# Patient Record
Sex: FEMALE | Race: WHITE | URBAN - METROPOLITAN AREA
[De-identification: names, ages, dates, MRNs, and addresses within clinical notes are randomized per-mention and may not be internally consistent; named-entity substitution may affect disease eponyms.]

---

## 2018-12-18 ENCOUNTER — EMERGENCY (EMERGENCY)
Facility: HOSPITAL | Age: 28
LOS: 0 days | Discharge: HOME | End: 2018-12-18
Attending: EMERGENCY MEDICINE | Admitting: EMERGENCY MEDICINE

## 2018-12-18 VITALS
DIASTOLIC BLOOD PRESSURE: 60 MMHG | SYSTOLIC BLOOD PRESSURE: 122 MMHG | HEART RATE: 90 BPM | OXYGEN SATURATION: 99 % | TEMPERATURE: 97 F | RESPIRATION RATE: 20 BRPM

## 2018-12-18 VITALS
DIASTOLIC BLOOD PRESSURE: 58 MMHG | RESPIRATION RATE: 20 BRPM | HEART RATE: 102 BPM | OXYGEN SATURATION: 98 % | SYSTOLIC BLOOD PRESSURE: 104 MMHG

## 2018-12-18 DIAGNOSIS — T14.91XA SUICIDE ATTEMPT, INITIAL ENCOUNTER: ICD-10-CM
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
## 2018-12-18 DIAGNOSIS — R45.1 RESTLESSNESS AND AGITATION: ICD-10-CM

## 2018-12-18 RX ORDER — HALOPERIDOL DECANOATE 100 MG/ML
5 INJECTION INTRAMUSCULAR ONCE
Refills: 0 | Status: COMPLETED | OUTPATIENT
Start: 2018-12-18 | End: 2018-12-18

## 2018-12-18 RX ADMIN — HALOPERIDOL DECANOATE 5 MILLIGRAM(S): 100 INJECTION INTRAMUSCULAR at 14:26

## 2018-12-18 RX ADMIN — Medication 2 MILLIGRAM(S): at 14:26

## 2018-12-18 NOTE — ED ADULT TRIAGE NOTE - CHIEF COMPLAINT QUOTE
RUDDY from [facility] 6 pt. was trying to kill herself by strangling with pair of pants / pt. turned blue as per staff

## 2018-12-18 NOTE — ED ADULT NURSE NOTE - CHIEF COMPLAINT QUOTE
RUDDY from [facility] pt. was trying to kill herself by strangling with pair of pants / pt. turned blue as per staff

## 2018-12-18 NOTE — ED ADULT NURSE NOTE - NSIMPLEMENTINTERV_GEN_ALL_ED
Implemented All Fall Risk Interventions:  [place] to call system. Call bell, personal items and telephone within reach. Instruct patient to call for assistance. Room bathroom lighting operational. Non-slip footwear when patient is off stretcher. Physically safe environment: no spills, clutter or unnecessary equipment. Stretcher in lowest position, wheels locked, appropriate side rails in place. Provide visual cue, wrist band, yellow gown, etc. Monitor gait and stability. Monitor for mental status changes and reorient to person, place, and time. Review medications for side effects contributing to fall risk. Reinforce activity limits and safety measures with patient and family.

## 2018-12-18 NOTE — ED PROVIDER NOTE - PHYSICAL EXAMINATION
CONSTITUTIONAL: Well-developed; well-nourished; agitated female  SKIN: warm, dry  HEAD: Normocephalic; atraumatic.  EYES: no conj injection  ENT: No nasal discharge; airway clear.  NECK: no contusion noted; Supple; non tender.  EXT: moving all extremities appropriately, no gross injuries noted  NEURO: Alert, oriented  PSYCH: agitated, labile mood

## 2018-12-18 NOTE — ED ADULT NURSE REASSESSMENT NOTE - NS ED NURSE REASSESS COMMENT FT1
pt assessed , A&Ox3, pt states her given name is Gabbie but likes to be called Ilene.  pt received from front of Main Ed, as per Charge RN pt was sent in from [facility] 6 with attendant from [facility]. In report was told pt was found with pants around neck. As per [place] [id number] attendant " when we found her she was blue and had to rip the blue pants off her neck, she ambulated herself on to ambulance and when she was put in handcuffs by police officers for safety she started acting erratic." Pt currently erratic at this time. 1:1 immediately began. 1:1 remains at bedside. safety and comfort measures maintained. will continue to monitor.

## 2018-12-18 NOTE — ED PROVIDER NOTE - PROGRESS NOTE DETAILS
pt assesed, screaming, hit arms against stretcher. pt to be moved to crit. will medicate and 4 point restraint prior Spoke to psych MD at [facility] -- Dr Pickett. Gave history of pt as well as today's episode. Maladaptable to society -- has been inpatient resident at [facility] for 4 years. Pt has no neck pain or other complaints. Not amenable to speaking to our psychiatrist. Will d/c back to facility.

## 2018-12-18 NOTE — ED PROVIDER NOTE - ATTENDING CONTRIBUTION TO CARE
28 y.o female with hx of bipolar disorder, mood lability, impulsivity, chronic inhabitant at [facility] x 3 years presents to ED with outburst at facility where she tied a piece of clothing around her neck and was found breathing spontaneously, but cyanotic.  Patient was freed from clothing and regained consciousness.  Currently has no complaints.  No neck pain, throat pain, dysphonia.  PE:  agree with above.  A/P:  Suicidal Attempt.  No acute medical issues pertaining to this attempt.  D/C back to her home psychiatric facility.

## 2018-12-18 NOTE — ED PROVIDER NOTE - OBJECTIVE STATEMENT
27 y/o female brought in from [facility] for suicide attempt. Patient tied a piece of clothing around her neck, was found down, cyanotic; EMS cut the clothing off and patient was satting in the 90's, spontaneous respirations. 27 y/o female h/o BPD, mood lability, impulsivity brought in from [place] for suicide attempt. Patient had episode of impulsivity -- tied a piece of clothing around her neck, was found down, cyanotic; MD at facility cut the clothing off and patient was satting in the 90's, spontaneous respirations. Pt had similar episode 3 years ago.

## 2019-04-24 PROBLEM — Z00.00 ENCOUNTER FOR PREVENTIVE HEALTH EXAMINATION: Status: ACTIVE | Noted: 2019-04-24

## 2019-05-21 ENCOUNTER — APPOINTMENT (OUTPATIENT)
Dept: DERMATOLOGY | Facility: CLINIC | Age: 29
End: 2019-05-21

## 2019-05-21 ENCOUNTER — OUTPATIENT (OUTPATIENT)
Dept: OUTPATIENT SERVICES | Facility: HOSPITAL | Age: 29
LOS: 1 days | Discharge: HOME | End: 2019-05-21

## 2019-05-21 DIAGNOSIS — K59.09 OTHER CONSTIPATION: ICD-10-CM

## 2019-05-21 DIAGNOSIS — F60.3 BORDERLINE PERSONALITY DISORDER: ICD-10-CM

## 2019-05-21 DIAGNOSIS — L70.9 ACNE, UNSPECIFIED: ICD-10-CM

## 2019-05-21 DIAGNOSIS — L21.9 SEBORRHEIC DERMATITIS, UNSPECIFIED: ICD-10-CM

## 2019-05-21 RX ORDER — HYDROCORTISONE 10 MG/G
1 CREAM TOPICAL
Qty: 1 | Refills: 0 | Status: ACTIVE | COMMUNITY
Start: 2019-05-21

## 2019-05-21 RX ORDER — CLINDAMYCIN PHOSPHATE 10 MG/ML
1 LOTION TOPICAL
Qty: 1 | Refills: 0 | Status: ACTIVE | COMMUNITY
Start: 2019-05-21

## 2019-05-21 RX ORDER — CLONAZEPAM 2 MG/1
2 TABLET ORAL TWICE DAILY
Qty: 30 | Refills: 0 | Status: ACTIVE | COMMUNITY
Start: 2019-05-21

## 2019-05-21 RX ORDER — LITHIUM CARBONATE 300 MG/1
300 TABLET ORAL TWICE DAILY
Qty: 60 | Refills: 0 | Status: ACTIVE | COMMUNITY
Start: 2019-05-21

## 2019-05-21 NOTE — ASSESSMENT
[FreeTextEntry1] : # Acne\par - clindamycin as recorded in med rec\par \par # Seborrheic Dermatitis\par - Hydrocortisone 1% - as recorded in med rec\par - Use OTC Neutrogena moisturizer\par \par - F/u PRN

## 2019-05-21 NOTE — HISTORY OF PRESENT ILLNESS
[de-identified] : 29 F w/ PMH of Borderline personality disorder, and constipation is referred for acne.